# Patient Record
Sex: FEMALE | URBAN - METROPOLITAN AREA
[De-identification: names, ages, dates, MRNs, and addresses within clinical notes are randomized per-mention and may not be internally consistent; named-entity substitution may affect disease eponyms.]

---

## 2018-10-19 ENCOUNTER — NEW PATIENT (OUTPATIENT)
Dept: URBAN - METROPOLITAN AREA CLINIC 10 | Facility: CLINIC | Age: 53
End: 2018-10-19
Payer: MEDICAID

## 2018-10-19 PROCEDURE — 92002 INTRM OPH EXAM NEW PATIENT: CPT | Performed by: OPTOMETRIST

## 2018-10-19 RX ORDER — PREDNISOLONE ACETATE 10 MG/ML
1 % SUSPENSION/ DROPS OPHTHALMIC
Qty: 1 | Refills: 0 | Status: INACTIVE
Start: 2018-10-19 | End: 2018-10-25

## 2018-10-25 ENCOUNTER — FOLLOW UP ESTABLISHED (OUTPATIENT)
Dept: URBAN - METROPOLITAN AREA CLINIC 10 | Facility: CLINIC | Age: 53
End: 2018-10-25
Payer: MEDICAID

## 2018-10-25 DIAGNOSIS — H15.112 EPISCLERITIS PERIODICA FUGAX, LEFT EYE: Primary | ICD-10-CM

## 2018-10-25 PROCEDURE — 92012 INTRM OPH EXAM EST PATIENT: CPT | Performed by: OPTOMETRIST

## 2018-10-25 ASSESSMENT — VISUAL ACUITY
OD: 20/40
OS: 20/30

## 2018-11-20 ENCOUNTER — FOLLOW UP ESTABLISHED (OUTPATIENT)
Dept: URBAN - METROPOLITAN AREA CLINIC 10 | Facility: CLINIC | Age: 53
End: 2018-11-20
Payer: MEDICAID

## 2018-11-20 DIAGNOSIS — H43.22 CRYSTALLINE DEPOSITS IN VITREOUS BODY, LEFT EYE: ICD-10-CM

## 2018-11-20 DIAGNOSIS — H50.111 MONOCULAR EXOTROPIA, RIGHT EYE: Primary | ICD-10-CM

## 2018-11-20 PROCEDURE — 92133 CPTRZD OPH DX IMG PST SGM ON: CPT | Performed by: OPTOMETRIST

## 2018-11-20 PROCEDURE — 92014 COMPRE OPH EXAM EST PT 1/>: CPT | Performed by: OPTOMETRIST

## 2018-11-20 ASSESSMENT — VISUAL ACUITY
OS: 20/25
OD: 20/30

## 2018-11-20 ASSESSMENT — KERATOMETRY
OS: 42.38
OD: 42.38

## 2018-11-20 ASSESSMENT — INTRAOCULAR PRESSURE
OD: 19
OS: 12

## 2021-06-29 ENCOUNTER — OFFICE VISIT (OUTPATIENT)
Dept: URBAN - METROPOLITAN AREA CLINIC 10 | Facility: CLINIC | Age: 56
End: 2021-06-29
Payer: MEDICAID

## 2021-06-29 DIAGNOSIS — H40.013 OPEN ANGLE WITH BORDERLINE FINDINGS, LOW RISK, BILATERAL: ICD-10-CM

## 2021-06-29 DIAGNOSIS — H04.123 TEAR FILM INSUFFICIENCY OF BILATERAL LACRIMAL GLANDS: Primary | ICD-10-CM

## 2021-06-29 PROCEDURE — 99213 OFFICE O/P EST LOW 20 MIN: CPT | Performed by: STUDENT IN AN ORGANIZED HEALTH CARE EDUCATION/TRAINING PROGRAM

## 2021-06-29 NOTE — IMPRESSION/PLAN
Impression: Open angle with borderline findings, low risk, bilateral
-Patient very apprehensive, patient deferred testing, she would like to be seen by Dr. Carlos Kaiser. Plan: IOP is asymmetric and nerves are suspicious. Pt. difficult to examine and uncertain fhx. [employer] OCT: thin sup and temp OD, thin sup c early nasal thinning OS. RTC for baseline HVF, pachs and attempt IOP check (pt. very apprehensive).

## 2021-06-29 NOTE — IMPRESSION/PLAN
Impression: Tear film insufficiency of bilateral lacrimal glands: H04.123.
-No infection today Plan: Patient educated on signs and symptoms of dry eye and importance of environmental factors. Discussed using OTC AT's with patient QID OU long-term, Gel drop QHS OU, and  warm compresses BID 5-10min OU long-term. Educated patient if signs or symptoms worsen to RTC for dry eye work-up.